# Patient Record
Sex: MALE | Race: WHITE | NOT HISPANIC OR LATINO
[De-identification: names, ages, dates, MRNs, and addresses within clinical notes are randomized per-mention and may not be internally consistent; named-entity substitution may affect disease eponyms.]

---

## 2019-01-07 PROBLEM — Z00.00 ENCOUNTER FOR PREVENTIVE HEALTH EXAMINATION: Status: ACTIVE | Noted: 2019-01-07

## 2019-01-08 ENCOUNTER — APPOINTMENT (OUTPATIENT)
Dept: UROLOGY | Facility: CLINIC | Age: 64
End: 2019-01-08
Payer: MEDICARE

## 2019-01-08 VITALS
SYSTOLIC BLOOD PRESSURE: 134 MMHG | OXYGEN SATURATION: 97 % | DIASTOLIC BLOOD PRESSURE: 82 MMHG | HEART RATE: 72 BPM | BODY MASS INDEX: 31.5 KG/M2 | RESPIRATION RATE: 17 BRPM | WEIGHT: 220 LBS | HEIGHT: 70 IN

## 2019-01-08 DIAGNOSIS — Z87.442 PERSONAL HISTORY OF URINARY CALCULI: ICD-10-CM

## 2019-01-08 DIAGNOSIS — Z86.69 PERSONAL HISTORY OF OTHER DISEASES OF THE NERVOUS SYSTEM AND SENSE ORGANS: ICD-10-CM

## 2019-01-08 DIAGNOSIS — Z80.6 FAMILY HISTORY OF LEUKEMIA: ICD-10-CM

## 2019-01-08 DIAGNOSIS — Z72.0 TOBACCO USE: ICD-10-CM

## 2019-01-08 DIAGNOSIS — Z87.440 PERSONAL HISTORY OF URINARY (TRACT) INFECTIONS: ICD-10-CM

## 2019-01-08 DIAGNOSIS — Z84.89 FAMILY HISTORY OF OTHER SPECIFIED CONDITIONS: ICD-10-CM

## 2019-01-08 PROCEDURE — 99204 OFFICE O/P NEW MOD 45 MIN: CPT

## 2019-01-08 NOTE — ASSESSMENT
[FreeTextEntry1] : Very pleasant 63-year-old gentleman with history of BPH, acute urinary tract infection, dysuria, concern for prostatitis\par -Discussed the natural history of BPH, as well as typical symptoms of an enlarged prostate. Discussed potential complications that could arise from BPH, including but not limited to urinary tract infections, bladder stones, and renal impairment.\par -Trial of Flomax for one month\par -I discussed the risks, benefits, alternatives, and possible side effects of alpha blocker therapy with the patient, including but not limited to dizziness, lightheadedness, headache, blurred vision, retrograde ejaculation, and priapism with the patient. I also discussed that the patient must inform his ophthalmologist that he has taken an alpha blocker prior to undergoing cataract or glaucoma surgery.\par -Continue ciprofloxacin for a total of 14 days\par -Laboratory data reviewed from [facility]\par -Records from [facility] reviewed\par -Records from prior urologist reviewed\par -PSA, urinalysis, urine culture in 2 weeks\par -Follow up in 3 weeks

## 2019-01-08 NOTE — PHYSICAL EXAM
[General Appearance - Well Developed] : well developed [General Appearance - Well Nourished] : well nourished [Normal Appearance] : normal appearance [Well Groomed] : well groomed [General Appearance - In No Acute Distress] : no acute distress [Edema] : no peripheral edema [Respiration, Rhythm And Depth] : normal respiratory rhythm and effort [Exaggerated Use Of Accessory Muscles For Inspiration] : no accessory muscle use [Abdomen Soft] : soft [Abdomen Tenderness] : non-tender [Costovertebral Angle Tenderness] : no ~M costovertebral angle tenderness [Urethral Meatus] : meatus normal [Urinary Bladder Findings] : the bladder was normal on palpation [Scrotum] : the scrotum was normal [Testes Mass (___cm)] : there were no testicular masses [No Prostate Nodules] : no prostate nodules [Normal Station and Gait] : the gait and station were normal for the patient's age [] : no rash [No Focal Deficits] : no focal deficits [Oriented To Time, Place, And Person] : oriented to person, place, and time [Affect] : the affect was normal [Mood] : the mood was normal [Not Anxious] : not anxious [No Palpable Adenopathy] : no palpable adenopathy

## 2019-01-08 NOTE — REVIEW OF SYSTEMS
[see HPI] : see HPI [Dysuria] : dysuria [Urine Infection (bladder/kidney)] : bladder/kidney infection [Pain during urination] : pain during urination [Blood in urine that you can see] : blood visible in urine [History of kidney stones] : history of kidney stones [Strong urge to urinate] : strong urge to urinate [Strain or push to urinate] : strain or push to urinate [Interrupted urine stream] : interrupted urine stream [Negative] : Heme/Lymph

## 2019-01-08 NOTE — HISTORY OF PRESENT ILLNESS
[FreeTextEntry1] : Very pleasant 63 year-old gentleman who presents for evaluation of BPH with urinary obstruction, UTI, prostatitis. Patient reports that last week he began to experience right flank pain, and subsequently dysuria, fevers, chills, urinary incontinence. At this time, a PSA was 18 up from one previously over the last few years. Additionally, a urine culture was positive for Escherichia coli. He started ciprofloxacin 4 days ago and immediately defervesced.  He now reports only mild dysuria. No hematuria. No flank pain or suprapubic pain. No nausea or vomiting. He still reports a weak urinary stream. No other complaints.\par \par Review of records demonstrates a PVR of approximately 70 before. He has not ever taking anything for urination or for his prostate. He has never been told about an elevated PSA in the past.\par \par Family history of bladder cancer in his father, secondary Actos. [Urinary Retention] : no urinary retention [Urinary Urgency] : no urinary urgency [Urinary Frequency] : urinary frequency [Nocturia] : no nocturia [Straining] : no straining [Weak Stream] : weak stream [Dysuria] : dysuria [Hematuria - Gross] : no gross hematuria [Hematuria - Microscopic] : microscopic hematuria [Bladder Spasm] : no bladder spasm [Abdominal Pain] : no abdominal pain [Flank Pain] : no flank pain [Fever] : no fever [Fatigue] : no fatigue [Nausea] : no nausea [Anorexia] : no anorexia

## 2019-01-25 LAB
APPEARANCE: CLEAR
BACTERIA: NEGATIVE
BILIRUBIN URINE: NEGATIVE
BLOOD URINE: NEGATIVE
COLOR: YELLOW
GLUCOSE QUALITATIVE U: NEGATIVE MG/DL
HYALINE CASTS: 3 /LPF
KETONES URINE: NEGATIVE
LEUKOCYTE ESTERASE URINE: NEGATIVE
MICROSCOPIC-UA: NORMAL
NITRITE URINE: NEGATIVE
PH URINE: 5.5
PROTEIN URINE: NEGATIVE MG/DL
PSA FREE FLD-MCNC: 10 %
PSA FREE SERPL-MCNC: 0.23 NG/ML
PSA SERPL-MCNC: 2.34 NG/ML
RED BLOOD CELLS URINE: 0 /HPF
SPECIFIC GRAVITY URINE: 1.01
SQUAMOUS EPITHELIAL CELLS: 0 /HPF
UROBILINOGEN URINE: NEGATIVE MG/DL
WHITE BLOOD CELLS URINE: 1 /HPF

## 2019-01-28 LAB — BACTERIA UR CULT: NORMAL

## 2019-02-01 ENCOUNTER — APPOINTMENT (OUTPATIENT)
Dept: UROLOGY | Facility: CLINIC | Age: 64
End: 2019-02-01
Payer: MEDICARE

## 2019-02-01 VITALS
HEIGHT: 70 IN | SYSTOLIC BLOOD PRESSURE: 134 MMHG | TEMPERATURE: 98.3 F | BODY MASS INDEX: 31.5 KG/M2 | DIASTOLIC BLOOD PRESSURE: 72 MMHG | WEIGHT: 220 LBS

## 2019-02-01 PROCEDURE — 99214 OFFICE O/P EST MOD 30 MIN: CPT

## 2019-02-01 NOTE — HISTORY OF PRESENT ILLNESS
[FreeTextEntry1] : Very pleasant 63-year-old gentleman who presents for followup of BPH with urinary obstruction, recent episode of prostatitis and elevated PSA. Patient completed a full course of ciprofloxacin and now feels much better. Additionally, he is taking tamsulosin and reports a significant improvement in his urinary stream on the medication. Recent PSA decreased to 2.34. He denies dysuria. He feels like he is emptying his bladder completely. No flank pain or suprapubic pain. No other complaints. [Urinary Retention] : no urinary retention [Urinary Urgency] : no urinary urgency [Nocturia] : no nocturia [Straining] : no straining [Dysuria] : no dysuria [Hematuria - Gross] : no gross hematuria [Bladder Spasm] : no bladder spasm [Abdominal Pain] : no abdominal pain [Flank Pain] : no flank pain [Fever] : no fever [Fatigue] : no fatigue [Nausea] : no nausea [Anorexia] : no anorexia

## 2019-02-01 NOTE — ASSESSMENT
[FreeTextEntry1] : Very pleasant 63-year-old gentleman who presents for followup of BPH with urinary obstruction, bacterial prostatitis, elevated PSA\par -Labs reviewed with the patient in detail\par -We discussed the etiology of his elevated PSA likely to be prostatitis; this has come down nicely to 2.34\par -Continue tamsulosin\par -Diagram used to demonstrate pathophysiology of BPH\par -Follow up in 6 months\par -Will refill prescription for tamsulosin when patient calls regarding the pharmacy he wishes to use

## 2019-02-05 ENCOUNTER — MEDICATION RENEWAL (OUTPATIENT)
Age: 64
End: 2019-02-05

## 2019-05-22 ENCOUNTER — RESULT REVIEW (OUTPATIENT)
Age: 64
End: 2019-05-22

## 2019-07-12 ENCOUNTER — APPOINTMENT (OUTPATIENT)
Dept: UROLOGY | Facility: CLINIC | Age: 64
End: 2019-07-12
Payer: MEDICARE

## 2019-07-12 VITALS
BODY MASS INDEX: 33.07 KG/M2 | HEIGHT: 70 IN | SYSTOLIC BLOOD PRESSURE: 130 MMHG | WEIGHT: 231 LBS | DIASTOLIC BLOOD PRESSURE: 80 MMHG

## 2019-07-12 PROCEDURE — 99213 OFFICE O/P EST LOW 20 MIN: CPT

## 2019-07-12 NOTE — PHYSICAL EXAM
[General Appearance - Well Developed] : well developed [General Appearance - Well Nourished] : well nourished [Well Groomed] : well groomed [Normal Appearance] : normal appearance [General Appearance - In No Acute Distress] : no acute distress [Abdomen Soft] : soft [Costovertebral Angle Tenderness] : no ~M costovertebral angle tenderness [Abdomen Tenderness] : non-tender [Urinary Bladder Findings] : the bladder was normal on palpation [Urethral Meatus] : meatus normal [Scrotum] : the scrotum was normal [Testes Mass (___cm)] : there were no testicular masses [No Prostate Nodules] : no prostate nodules [] : no respiratory distress [Edema] : no peripheral edema [Respiration, Rhythm And Depth] : normal respiratory rhythm and effort [Oriented To Time, Place, And Person] : oriented to person, place, and time [Exaggerated Use Of Accessory Muscles For Inspiration] : no accessory muscle use [Affect] : the affect was normal [Mood] : the mood was normal [Normal Station and Gait] : the gait and station were normal for the patient's age [Not Anxious] : not anxious [No Focal Deficits] : no focal deficits [No Palpable Adenopathy] : no palpable adenopathy

## 2019-07-12 NOTE — HISTORY OF PRESENT ILLNESS
[FreeTextEntry1] : Very pleasant 64-year-old gentleman who presents for followup of BPH, prostatitis, elevated PSA. Most recent PSA came down to 2.3 for January. He denies further episodes of urinary tract infections or prostatitis. He continues to tamsulosin with significant improvement in his urinary symptoms. He denies dysuria. No hematuria. No flank pain or suprapubic pain. No other complaints. [Urinary Retention] : no urinary retention [Urinary Urgency] : no urinary urgency [Nocturia] : no nocturia [Straining] : no straining [Dysuria] : no dysuria [Hematuria - Gross] : no gross hematuria [Bladder Spasm] : no bladder spasm [Abdominal Pain] : no abdominal pain [Flank Pain] : no flank pain [Fever] : no fever [Fatigue] : no fatigue [Nausea] : no nausea [Anorexia] : no anorexia

## 2019-07-12 NOTE — ASSESSMENT
[FreeTextEntry1] : Very pleasant 64-year-old gentleman who presents for followup of elevated PSA, BPH, history of prostatitis\par -Continue tamsulosin\par -PSA\par -Urine culture\par -Followup in one year

## 2019-11-26 ENCOUNTER — APPOINTMENT (OUTPATIENT)
Dept: NEUROLOGY | Facility: CLINIC | Age: 64
End: 2019-11-26
Payer: MEDICARE

## 2019-11-26 VITALS — OXYGEN SATURATION: 98 % | HEIGHT: 70 IN | WEIGHT: 225 LBS | BODY MASS INDEX: 32.21 KG/M2

## 2019-11-26 VITALS — SYSTOLIC BLOOD PRESSURE: 138 MMHG | DIASTOLIC BLOOD PRESSURE: 85 MMHG | HEART RATE: 89 BPM

## 2019-11-26 DIAGNOSIS — Z80.8 FAMILY HISTORY OF MALIGNANT NEOPLASM OF OTHER ORGANS OR SYSTEMS: ICD-10-CM

## 2019-11-26 DIAGNOSIS — Z82.49 FAMILY HISTORY OF ISCHEMIC HEART DISEASE AND OTHER DISEASES OF THE CIRCULATORY SYSTEM: ICD-10-CM

## 2019-11-26 DIAGNOSIS — Z80.9 FAMILY HISTORY OF MALIGNANT NEOPLASM, UNSPECIFIED: ICD-10-CM

## 2019-11-26 DIAGNOSIS — Z83.3 FAMILY HISTORY OF DIABETES MELLITUS: ICD-10-CM

## 2019-11-26 DIAGNOSIS — Z78.9 OTHER SPECIFIED HEALTH STATUS: ICD-10-CM

## 2019-11-26 DIAGNOSIS — Z80.52 FAMILY HISTORY OF MALIGNANT NEOPLASM OF BLADDER: ICD-10-CM

## 2019-11-26 PROCEDURE — 99204 OFFICE O/P NEW MOD 45 MIN: CPT

## 2019-11-26 NOTE — CONSULT LETTER
[Consult Letter:] : I had the pleasure of evaluating your patient, [unfilled]. [Dear  ___] : Dear  [unfilled], [Sincerely,] : Sincerely, [Please see my note below.] : Please see my note below. [Consult Closing:] : Thank you very much for allowing me to participate in the care of this patient.  If you have any questions, please do not hesitate to contact me. [FreeTextEntry3] : Ana Artis MD, MPH\par

## 2019-11-26 NOTE — HISTORY OF PRESENT ILLNESS
[FreeTextEntry1] : The patient is here for evaluation of numbness starting in 2016. The patient initially had numbness in couple digits in the toes which progressed to the whole feet. He has occasional tingling but it is infrequent. He denies weakness in the legs. The hands are unaffected. He has difficulty with balance at times.\par \par The patient was seen by a neurologist previously in giving gabapentin which the patient stopped as he was having problems with the medication.  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 He had neuropathy lab results EMG results are not available at this time.\par \par His predominant symptoms or numbness.

## 2019-11-26 NOTE — PHYSICAL EXAM
[General Appearance - Alert] : alert [General Appearance - In No Acute Distress] : in no acute distress [Person] : oriented to person [Place] : oriented to place [Registration Intact] : recent registration memory intact [Time] : oriented to time [Concentration Intact] : normal concentrating ability [Visual Intact] : visual attention was ~T not ~L decreased [Fluency] : fluency intact [Naming Objects] : no difficulty naming common objects [Repeating Phrases] : no difficulty repeating a phrase [Cranial Nerves Optic (II)] : visual acuity intact bilaterally,  visual fields full to confrontation, pupils equal round and reactive to light [Vocabulary] : adequate range of vocabulary [Comprehension] : comprehension intact [Cranial Nerves Trigeminal (V)] : facial sensation intact symmetrically [Cranial Nerves Oculomotor (III)] : extraocular motion intact [Cranial Nerves Vestibulocochlear (VIII)] : hearing was intact bilaterally [Cranial Nerves Glossopharyngeal (IX)] : tongue and palate midline [Cranial Nerves Facial (VII)] : face symmetrical [Motor Tone] : muscle tone was normal in all four extremities [Cranial Nerves Hypoglossal (XII)] : there was no tongue deviation with protrusion [Cranial Nerves Accessory (XI - Cranial And Spinal)] : head turning and shoulder shrug symmetric [Involuntary Movements] : no involuntary movements were seen [Motor Strength] : muscle strength was normal in all four extremities [Sensation Tactile Decrease] : light touch was intact [Proprioception] : proprioception was intact [Sensation Pain / Temperature Decrease] : pain and temperature was intact [Vibration Decrease Leg / Foot Toes Both Feet] : decreased at the toes of both feet  [Balance] : balance was intact [Abnormal Walk] : normal gait [1+] : Ankle jerk left 1+ [Full Pulse] : the pedal pulses are present [Romberg's Sign] : Romberg's sign was negtive [Coordination - Dysmetria Impaired Finger-to-Nose Bilateral] : not present [Plantar Reflex Right Only] : normal on the right [Coordination - Dysmetria Impaired Heel-to-Shin Bilateral] : not present [Plantar Reflex Left Only] : normal on the left [FreeTextEntry5] : fundi not visualized

## 2019-11-26 NOTE — ASSESSMENT
[FreeTextEntry1] : Numbness and occasional tingling as well as difficulty with balance concerning for peripheral neuropathy.  Will get basic neuropathy labs and ncs/emgs and will obtain copy of his prior work up.\par \par fu after the tests above

## 2019-11-26 NOTE — REVIEW OF SYSTEMS
[As Noted in HPI] : as noted in HPI [Eyesight Problems] : eyesight problems [Fever] : no fever [Chest Pain] : no chest pain [Loss Of Hearing] : no hearing loss [Anxiety] : no anxiety [Shortness Of Breath] : no shortness of breath [Vomiting] : no vomiting [Itching] : no itching [Joint Pain] : no joint pain [Incontinence] : no incontinence [Easy Bleeding] : no tendency for easy bleeding [Muscle Weakness] : no muscle weakness

## 2020-02-25 ENCOUNTER — APPOINTMENT (OUTPATIENT)
Dept: NEUROLOGY | Facility: CLINIC | Age: 65
End: 2020-02-25
Payer: MEDICARE

## 2020-02-25 VITALS — OXYGEN SATURATION: 98 % | DIASTOLIC BLOOD PRESSURE: 82 MMHG | HEART RATE: 78 BPM | SYSTOLIC BLOOD PRESSURE: 134 MMHG

## 2020-02-25 VITALS — HEIGHT: 70 IN | BODY MASS INDEX: 31.35 KG/M2 | WEIGHT: 219 LBS

## 2020-02-25 PROCEDURE — 95886 MUSC TEST DONE W/N TEST COMP: CPT

## 2020-02-25 PROCEDURE — 99214 OFFICE O/P EST MOD 30 MIN: CPT

## 2020-02-25 PROCEDURE — 95913 NRV CNDJ TEST 13/> STUDIES: CPT

## 2020-02-25 PROCEDURE — 99213 OFFICE O/P EST LOW 20 MIN: CPT | Mod: 25

## 2020-02-25 PROCEDURE — 99213 OFFICE O/P EST LOW 20 MIN: CPT

## 2020-02-25 NOTE — PHYSICAL EXAM
[General Appearance - Alert] : alert [General Appearance - In No Acute Distress] : in no acute distress [Person] : oriented to person [Place] : oriented to place [Time] : oriented to time [Registration Intact] : recent registration memory intact [Concentration Intact] : normal concentrating ability [Fluency] : fluency intact [Repeating Phrases] : no difficulty repeating a phrase [Naming Objects] : no difficulty naming common objects [Comprehension] : comprehension intact [Vocabulary] : adequate range of vocabulary [Motor Tone] : muscle tone was normal in all four extremities [Cranial Nerves Facial (VII)] : face symmetrical [Motor Strength] : muscle strength was normal in all four extremities [Sensation Tactile Decrease] : light touch was intact [Abnormal Walk] : normal gait [Balance] : balance was intact [FreeTextEntry1] : /82  P78 98%sat

## 2020-02-25 NOTE — HISTORY OF PRESENT ILLNESS
[FreeTextEntry1] : The patient is here for evaluation of numbness starting in 2016. The patient initially had numbness in couple digits in the toes which progressed to the whole feet. He has occasional tingling but it is infrequent. He denies weakness in the legs. The hands are unaffected. He has difficulty with balance at times.\par \par The patient was seen by a neurologist previously in giving gabapentin which the patient stopped as he was having problems with the medication. 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  His predominant symptoms of numbness. \par \par Additionally, the patient complains of occasional numbness/ tingling in the right hand shotting from the elbow down, provoked with elbow flexion, resolves with position change.  There is no arm weakness.

## 2020-02-25 NOTE — ASSESSMENT
[FreeTextEntry1] : Numbness and occasional tingling as well as difficulty with balance concerning for peripheral neuropathy with ncs/emg showing sensorimotor neuropathy, patient has HbA1C 6.2, he told previously told me that he had demyelinating features.  Will get ncs.emg of the arms to look for demyelination, the patient is also c/o on right hand tingling with elbow flexion.\par \par fu after emg\par

## 2020-02-25 NOTE — DATA REVIEWED
[de-identified] : HbA1C 6.2, other basic neuropathy labs unremarkable\par ncs/emg legs: sensorimotor neuropathy in the legs

## 2020-07-15 ENCOUNTER — APPOINTMENT (OUTPATIENT)
Dept: UROLOGY | Facility: CLINIC | Age: 65
End: 2020-07-15
Payer: COMMERCIAL

## 2020-07-15 VITALS
SYSTOLIC BLOOD PRESSURE: 128 MMHG | WEIGHT: 220 LBS | TEMPERATURE: 98.2 F | RESPIRATION RATE: 16 BRPM | DIASTOLIC BLOOD PRESSURE: 75 MMHG | HEART RATE: 80 BPM | HEIGHT: 70 IN | BODY MASS INDEX: 31.5 KG/M2

## 2020-07-15 PROCEDURE — 99213 OFFICE O/P EST LOW 20 MIN: CPT

## 2020-07-15 RX ORDER — ERGOCALCIFEROL (VITAMIN D2) 1250 MCG
50000 CAPSULE ORAL
Refills: 0 | Status: ACTIVE | COMMUNITY

## 2020-07-15 NOTE — ASSESSMENT
[FreeTextEntry1] : Very pleasant 65-year-old gentleman who presents for follow-up of BPH and screening for prostate cancer\par -Labs reviewed–PSA 2.34 from [facility]\par -Continue tamsulosin\par -Follow-up in 1 year

## 2020-07-15 NOTE — PHYSICAL EXAM
[General Appearance - Well Developed] : well developed [General Appearance - Well Nourished] : well nourished [Normal Appearance] : normal appearance [Well Groomed] : well groomed [General Appearance - In No Acute Distress] : no acute distress [Abdomen Soft] : soft [Abdomen Tenderness] : non-tender [Costovertebral Angle Tenderness] : no ~M costovertebral angle tenderness [Urethral Meatus] : meatus normal [Urinary Bladder Findings] : the bladder was normal on palpation [Testes Mass (___cm)] : there were no testicular masses [No Prostate Nodules] : no prostate nodules [Scrotum] : the scrotum was normal [Edema] : no peripheral edema [] : no respiratory distress [Respiration, Rhythm And Depth] : normal respiratory rhythm and effort [Oriented To Time, Place, And Person] : oriented to person, place, and time [Exaggerated Use Of Accessory Muscles For Inspiration] : no accessory muscle use [Not Anxious] : not anxious [Mood] : the mood was normal [Affect] : the affect was normal [Normal Station and Gait] : the gait and station were normal for the patient's age [No Focal Deficits] : no focal deficits [No Palpable Adenopathy] : no palpable adenopathy

## 2020-07-15 NOTE — HISTORY OF PRESENT ILLNESS
[FreeTextEntry1] : Very pleasant 65-year-old gentleman who presents for follow-up of BPH and screening for prostate cancer.  Patient was previously noted to have an elevated PSA over 20 and was diagnosed with prostatitis at that time.  His PSA subsequently decreased to 2.34 in January 2019.  It was repeated in February 2020 was found to be 2.34 as well.  He denies dysuria.  He reports that he is taking tamsulosin with improvement in his urinary symptoms.  He denies flank pain or suprapubic pain.  No nausea or vomiting.  No other complaints. [Urinary Retention] : no urinary retention [Urinary Urgency] : no urinary urgency [Nocturia] : no nocturia [Straining] : no straining [Dysuria] : no dysuria [Hematuria - Gross] : no gross hematuria [Bladder Spasm] : no bladder spasm [Abdominal Pain] : no abdominal pain [Flank Pain] : no flank pain [Fever] : no fever [Fatigue] : no fatigue [Nausea] : no nausea [Anorexia] : no anorexia

## 2020-09-01 ENCOUNTER — APPOINTMENT (OUTPATIENT)
Dept: NEUROLOGY | Facility: CLINIC | Age: 65
End: 2020-09-01
Payer: COMMERCIAL

## 2020-09-01 VITALS
BODY MASS INDEX: 31.5 KG/M2 | HEIGHT: 70 IN | OXYGEN SATURATION: 98 % | WEIGHT: 220 LBS | TEMPERATURE: 99.6 F | SYSTOLIC BLOOD PRESSURE: 130 MMHG | HEART RATE: 79 BPM | DIASTOLIC BLOOD PRESSURE: 74 MMHG

## 2020-09-01 DIAGNOSIS — R20.0 ANESTHESIA OF SKIN: ICD-10-CM

## 2020-09-01 PROCEDURE — 99214 OFFICE O/P EST MOD 30 MIN: CPT

## 2020-09-01 PROCEDURE — 95886 MUSC TEST DONE W/N TEST COMP: CPT | Mod: 50

## 2020-09-01 PROCEDURE — 95913 NRV CNDJ TEST 13/> STUDIES: CPT

## 2020-09-01 RX ORDER — ALBUTEROL SULFATE 90 UG/1
108 (90 BASE) INHALANT RESPIRATORY (INHALATION)
Qty: 25 | Refills: 0 | Status: ACTIVE | COMMUNITY
Start: 2020-04-22

## 2020-09-01 NOTE — PHYSICAL EXAM
[General Appearance - Alert] : alert [General Appearance - In No Acute Distress] : in no acute distress [Person] : oriented to person [Place] : oriented to place [Time] : oriented to time [Concentration Intact] : normal concentrating ability [Naming Objects] : no difficulty naming common objects [Repeating Phrases] : no difficulty repeating a phrase [Fluency] : fluency intact [Comprehension] : comprehension intact [Vocabulary] : adequate range of vocabulary [Motor Tone] : muscle tone was normal in all four extremities [Motor Strength] : muscle strength was normal in all four extremities [Involuntary Movements] : no involuntary movements were seen [Sensation Tactile Decrease] : light touch was intact [Abnormal Walk] : normal gait [Balance] : balance was intact

## 2020-09-01 NOTE — HISTORY OF PRESENT ILLNESS
[FreeTextEntry1] : The patient is here for evaluation of numbness starting in 2016. The patient initially had numbness in couple digits in the toes which progressed to the whole feet. He has occasional tingling but it is infrequent. He denies weakness in the legs. The hands are unaffected. He has difficulty with balance at times.\par \par The patient was seen by a neurologist previously in giving gabapentin which the patient stopped as he was having problems with the medication. 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 His predominant symptoms of numbness. \par \par Additionally, the patient complains of occasional numbness/ tingling in the right hand shooting from the elbow down, provoked with elbow flexion, resolves with position change. There is no arm weakness. \par \par No clinical change since last visit.\par

## 2020-09-01 NOTE — ASSESSMENT
[FreeTextEntry1] : Numbness and occasional tingling as well as difficulty with balance concerning for peripheral neuropathy with ncs/emg showing sensorimotor neuropathy, patient has HbA1C 6.2 with normal ncs/emg of the arms.

## 2021-07-20 ENCOUNTER — APPOINTMENT (OUTPATIENT)
Dept: UROLOGY | Facility: CLINIC | Age: 66
End: 2021-07-20
Payer: MEDICARE

## 2021-07-20 PROCEDURE — 99213 OFFICE O/P EST LOW 20 MIN: CPT

## 2021-07-20 NOTE — HISTORY OF PRESENT ILLNESS
[FreeTextEntry1] : Very pleasant 66-year-old gentleman who presents for follow-up of BPH and screening for prostate cancer.  Patient was previously noted to have an elevated PSA over 20 and was diagnosed with prostatitis at that time.  His PSA subsequently decreased to 2.34 in January 2019.  It was repeated in February 2020 was found to be 2.34 as well.  Most recent PSA from 4/2021 was 1.7.  He denies dysuria.  He reports that he is taking tamsulosin with improvement in his urinary symptoms.  He denies flank pain or suprapubic pain.  No nausea or vomiting.  No other complaints.

## 2021-07-20 NOTE — ASSESSMENT
[FreeTextEntry1] : Very pleasant 66-year-old gentleman who presents for follow-up of BPH, history of elevated PSA and prostatitis\par -Recent PSA from [facility] reviewed–1.7\par -We again discussed that his risk for prostate cancer is low\par -Continue tamsulosin–refill sent to the pharmacy\par -Follow-up in 1 year or sooner if necessary

## 2021-08-03 ENCOUNTER — OUTPATIENT (OUTPATIENT)
Dept: OUTPATIENT SERVICES | Facility: HOSPITAL | Age: 66
LOS: 1 days | End: 2021-08-03
Payer: MEDICARE

## 2021-08-03 DIAGNOSIS — R07.2 PRECORDIAL PAIN: ICD-10-CM

## 2021-08-03 PROCEDURE — 78452 HT MUSCLE IMAGE SPECT MULT: CPT | Mod: MH

## 2021-08-03 PROCEDURE — 93017 CV STRESS TEST TRACING ONLY: CPT

## 2021-08-03 PROCEDURE — A9502: CPT

## 2022-02-14 ENCOUNTER — RX RENEWAL (OUTPATIENT)
Age: 67
End: 2022-02-14

## 2022-07-20 ENCOUNTER — APPOINTMENT (OUTPATIENT)
Dept: UROLOGY | Facility: CLINIC | Age: 67
End: 2022-07-20

## 2022-07-20 VITALS
DIASTOLIC BLOOD PRESSURE: 78 MMHG | SYSTOLIC BLOOD PRESSURE: 132 MMHG | BODY MASS INDEX: 31.5 KG/M2 | HEART RATE: 83 BPM | WEIGHT: 220 LBS | TEMPERATURE: 98.7 F | HEIGHT: 70 IN

## 2022-07-20 PROCEDURE — 99214 OFFICE O/P EST MOD 30 MIN: CPT

## 2022-07-20 RX ORDER — ATORVASTATIN CALCIUM 40 MG/1
40 TABLET, FILM COATED ORAL
Qty: 90 | Refills: 0 | Status: ACTIVE | COMMUNITY
Start: 2022-01-14

## 2022-07-20 RX ORDER — CLOPIDOGREL BISULFATE 75 MG/1
75 TABLET, FILM COATED ORAL
Qty: 90 | Refills: 0 | Status: DISCONTINUED | COMMUNITY
Start: 2022-03-09 | End: 2022-07-20

## 2022-07-20 RX ORDER — CIPROFLOXACIN HYDROCHLORIDE 500 MG/1
500 TABLET, FILM COATED ORAL TWICE DAILY
Qty: 14 | Refills: 0 | Status: DISCONTINUED | COMMUNITY
Start: 2019-01-08 | End: 2022-07-20

## 2022-07-20 RX ORDER — METOPROLOL SUCCINATE 50 MG/1
50 TABLET, EXTENDED RELEASE ORAL
Qty: 90 | Refills: 0 | Status: ACTIVE | COMMUNITY
Start: 2022-03-09

## 2022-07-20 RX ORDER — EZETIMIBE 10 MG/1
10 TABLET ORAL
Qty: 90 | Refills: 0 | Status: ACTIVE | COMMUNITY
Start: 2022-03-09

## 2022-07-20 RX ORDER — CIPROFLOXACIN HYDROCHLORIDE 750 MG/1
TABLET, FILM COATED ORAL
Refills: 0 | Status: DISCONTINUED | COMMUNITY
End: 2022-07-20

## 2022-07-20 NOTE — HISTORY OF PRESENT ILLNESS
[FreeTextEntry1] : Very pleasant 67-year-old gentleman who presents for follow-up of BPH and screening for prostate cancer.  Patient was previously noted to have an elevated PSA over 20 and was diagnosed with prostatitis at that time.  His PSA subsequently decreased to 2.34 in January 2019.  It was repeated in February 2020 was found to be 2.34 as well.  PSA from 4/2021 was 1.7. Most recently from 6/2022 was 1.6.  He denies dysuria.  He continues to report that tamsulosin significantly improves his urinary symptoms.  He denies flank pain or suprapubic pain.  No nausea or vomiting.  No other complaints.

## 2022-07-20 NOTE — ASSESSMENT
[FreeTextEntry1] : Very pleasant 67-year-old gentleman who presents for follow-up of BPH, history of elevated PSA and prostatitis\par -Recent PSA from [facility] reviewed–1.6 from 1.7 last year\par -A1c 6.9\par -Creatinine 0.87\par -We again discussed that his risk for prostate cancer is low\par -Continue tamsulosin–refill sent to the pharmacy\par -Follow-up in 1 year or sooner if necessary.

## 2023-08-04 ENCOUNTER — APPOINTMENT (OUTPATIENT)
Dept: UROLOGY | Facility: CLINIC | Age: 68
End: 2023-08-04
Payer: MEDICARE

## 2023-08-04 DIAGNOSIS — Z12.5 ENCOUNTER FOR SCREENING FOR MALIGNANT NEOPLASM OF PROSTATE: ICD-10-CM

## 2023-08-04 PROCEDURE — 99213 OFFICE O/P EST LOW 20 MIN: CPT

## 2023-08-04 NOTE — HISTORY OF PRESENT ILLNESS
[FreeTextEntry1] : Very pleasant 68-year-old gentleman who presents for follow-up of BPH and screening for prostate cancer.    Patient was previously noted to have an elevated PSA over 20 and was diagnosed with prostatitis at that time.  His PSA subsequently decreased to 2.34 in January 2019.  It was repeated in February 2020 was found to be 2.34 as well.  PSA from 4/2021 was 1.7. PSA from 6/2022 was 1.6.  Most recently it was 1.2 in 2023. He denies dysuria.  He continues to report that tamsulosin significantly improves his urinary symptoms.  He denies flank pain or suprapubic pain.  No nausea or vomiting.  No other complaints.

## 2023-08-04 NOTE — ASSESSMENT
[FreeTextEntry1] : Very pleasant 68 year old gentleman who presents for follow up of BPH, screening for prostate cancer -PSA 1.2 -Continue tamsulosin- refill sent to the pharmacy -F/U in 1 year

## 2023-10-23 RX ORDER — TAMSULOSIN HYDROCHLORIDE 0.4 MG/1
0.4 CAPSULE ORAL
Qty: 90 | Refills: 3 | Status: ACTIVE | COMMUNITY
Start: 2019-01-08 | End: 1900-01-01

## 2023-11-19 ENCOUNTER — TRANSCRIPTION ENCOUNTER (OUTPATIENT)
Age: 68
End: 2023-11-19

## 2023-11-21 ENCOUNTER — APPOINTMENT (OUTPATIENT)
Dept: UROLOGY | Facility: CLINIC | Age: 68
End: 2023-11-21
Payer: MEDICARE

## 2023-11-21 DIAGNOSIS — N40.1 BENIGN PROSTATIC HYPERPLASIA WITH LOWER URINARY TRACT SYMPMS: ICD-10-CM

## 2023-11-21 DIAGNOSIS — N41.0 ACUTE PROSTATITIS: ICD-10-CM

## 2023-11-21 DIAGNOSIS — R30.0 DYSURIA: ICD-10-CM

## 2023-11-21 DIAGNOSIS — N13.8 BENIGN PROSTATIC HYPERPLASIA WITH LOWER URINARY TRACT SYMPMS: ICD-10-CM

## 2023-11-21 PROCEDURE — 99214 OFFICE O/P EST MOD 30 MIN: CPT | Mod: 95

## 2023-11-21 RX ORDER — CIPROFLOXACIN HYDROCHLORIDE 500 MG/1
500 TABLET, FILM COATED ORAL
Qty: 60 | Refills: 0 | Status: ACTIVE | COMMUNITY
Start: 2023-11-21 | End: 1900-01-01

## 2023-11-27 DIAGNOSIS — N39.0 URINARY TRACT INFECTION, SITE NOT SPECIFIED: ICD-10-CM

## 2023-11-27 LAB — BACTERIA UR CULT: ABNORMAL

## 2023-12-20 ENCOUNTER — NON-APPOINTMENT (OUTPATIENT)
Age: 68
End: 2023-12-20

## 2023-12-20 LAB — BACTERIA UR CULT: NORMAL

## 2024-08-06 ENCOUNTER — APPOINTMENT (OUTPATIENT)
Dept: UROLOGY | Facility: CLINIC | Age: 69
End: 2024-08-06

## 2024-08-06 PROCEDURE — G2211 COMPLEX E/M VISIT ADD ON: CPT

## 2024-08-06 PROCEDURE — 99214 OFFICE O/P EST MOD 30 MIN: CPT

## 2024-08-06 NOTE — HISTORY OF PRESENT ILLNESS
[None] : None [FreeTextEntry1] : Mr. Salas is a very pleasant 69 year old man here today for BPH. He reports feeling well since he was here last. He continues to take tamsulosin daily. Denies any recent UTI symptoms. Reports that for the past couple of months he has been having intermittent lower abdominal pain. He is following with GI whom plan to do a CT scan. Seeing PCP next week for labs including PSA. Denies any hematuria or dysuria.

## 2024-08-06 NOTE — ASSESSMENT
[FreeTextEntry1] : Mr. Saals is a very pleasant 69 year old man here today for BPH. He reports feeling well since he was here last. He continues to take tamsulosin daily. Denies any recent UTI symptoms. Reports that for the past couple of months he has been having intermittent lower abdominal pain. He is following with GI whom plan to do a CT scan. Seeing PCP next week for labs including PSA. Denies any hematuria or dysuria. UC. UA. PCP will fax us PSA results, and gastroenterologist will fax us the results of his scan CT scan Continue tamsulosin - refills sent to pharmacy. RTO in 6 months or sooner if needed.  Patient is being seen today for evaluation and management of a chronic and longitudinal ongoing condition and I am of the primary treating physician

## 2024-08-06 NOTE — PHYSICAL EXAM
[Normal Appearance] : normal appearance [Well Groomed] : well groomed [Edema] : no peripheral edema [General Appearance - In No Acute Distress] : no acute distress [Respiration, Rhythm And Depth] : normal respiratory rhythm and effort [Exaggerated Use Of Accessory Muscles For Inspiration] : no accessory muscle use [Abdomen Soft] : soft [Abdomen Tenderness] : non-tender [Costovertebral Angle Tenderness] : no ~M costovertebral angle tenderness [Normal Station and Gait] : the gait and station were normal for the patient's age [] : no rash [No Focal Deficits] : no focal deficits [Oriented To Time, Place, And Person] : oriented to person, place, and time [Mood] : the mood was normal [Affect] : the affect was normal [No Palpable Adenopathy] : no palpable adenopathy

## 2025-08-25 ENCOUNTER — NON-APPOINTMENT (OUTPATIENT)
Age: 70
End: 2025-08-25

## 2025-08-26 ENCOUNTER — NON-APPOINTMENT (OUTPATIENT)
Age: 70
End: 2025-08-26

## 2025-08-27 ENCOUNTER — APPOINTMENT (OUTPATIENT)
Dept: UROLOGY | Facility: CLINIC | Age: 70
End: 2025-08-27
Payer: MEDICARE

## 2025-08-27 VITALS
WEIGHT: 215 LBS | SYSTOLIC BLOOD PRESSURE: 116 MMHG | BODY MASS INDEX: 30.78 KG/M2 | TEMPERATURE: 98.8 F | HEIGHT: 70 IN | DIASTOLIC BLOOD PRESSURE: 69 MMHG | HEART RATE: 66 BPM | OXYGEN SATURATION: 98 %

## 2025-08-27 DIAGNOSIS — N40.1 BENIGN PROSTATIC HYPERPLASIA WITH LOWER URINARY TRACT SYMPMS: ICD-10-CM

## 2025-08-27 DIAGNOSIS — Z12.5 ENCOUNTER FOR SCREENING FOR MALIGNANT NEOPLASM OF PROSTATE: ICD-10-CM

## 2025-08-27 DIAGNOSIS — N13.8 BENIGN PROSTATIC HYPERPLASIA WITH LOWER URINARY TRACT SYMPMS: ICD-10-CM

## 2025-08-27 PROCEDURE — G2211 COMPLEX E/M VISIT ADD ON: CPT

## 2025-08-27 PROCEDURE — 99214 OFFICE O/P EST MOD 30 MIN: CPT
